# Patient Record
Sex: MALE | Race: WHITE | Employment: FULL TIME | URBAN - METROPOLITAN AREA
[De-identification: names, ages, dates, MRNs, and addresses within clinical notes are randomized per-mention and may not be internally consistent; named-entity substitution may affect disease eponyms.]

---

## 2021-04-18 ENCOUNTER — HOSPITAL ENCOUNTER (EMERGENCY)
Age: 26
Discharge: HOME | End: 2021-04-18
Attending: EMERGENCY MEDICINE
Payer: MEDICAID

## 2021-04-18 VITALS
RESPIRATION RATE: 18 BRPM | TEMPERATURE: 98.4 F | BODY MASS INDEX: 41.75 KG/M2 | WEIGHT: 315 LBS | DIASTOLIC BLOOD PRESSURE: 85 MMHG | HEIGHT: 73 IN | HEART RATE: 97 BPM | SYSTOLIC BLOOD PRESSURE: 155 MMHG | OXYGEN SATURATION: 97 %

## 2021-04-18 DIAGNOSIS — L02.91 ABSCESS: Primary | ICD-10-CM

## 2021-04-18 DIAGNOSIS — L05.01 PILONIDAL ABSCESS: ICD-10-CM

## 2021-04-18 PROCEDURE — 10080 I&D PILONIDAL CYST SIMPLE: CPT

## 2021-04-18 PROCEDURE — 10081 I&D PILONIDAL CYST COMP: CPT

## 2021-04-18 PROCEDURE — 99283 EMERGENCY DEPT VISIT LOW MDM: CPT

## 2021-04-18 RX ORDER — ACETAMINOPHEN 500 MG
1000 TABLET ORAL EVERY 6 HOURS PRN
Qty: 30 TABLET | Refills: 0 | Status: SHIPPED | OUTPATIENT
Start: 2021-04-18

## 2021-04-18 RX ORDER — SULFAMETHOXAZOLE AND TRIMETHOPRIM 800; 160 MG/1; MG/1
1 TABLET ORAL 2 TIMES DAILY
Qty: 14 TABLET | Refills: 0 | Status: SHIPPED | OUTPATIENT
Start: 2021-04-18 | End: 2021-04-25

## 2021-04-18 RX ORDER — IBUPROFEN 600 MG/1
600 TABLET ORAL EVERY 6 HOURS PRN
Qty: 30 TABLET | Refills: 0 | Status: SHIPPED | OUTPATIENT
Start: 2021-04-18

## 2021-04-18 RX ORDER — LIDOCAINE HYDROCHLORIDE 10 MG/ML
5 INJECTION, SOLUTION INFILTRATION; PERINEURAL ONCE
Status: DISCONTINUED | OUTPATIENT
Start: 2021-04-18 | End: 2021-04-18 | Stop reason: HOSPADM

## 2021-04-18 RX ORDER — CEPHALEXIN 500 MG/1
500 CAPSULE ORAL 4 TIMES DAILY
Qty: 28 CAPSULE | Refills: 0 | Status: SHIPPED | OUTPATIENT
Start: 2021-04-18 | End: 2021-04-25

## 2021-04-18 ASSESSMENT — PAIN SCALES - GENERAL: PAINLEVEL_OUTOF10: 8

## 2021-04-18 NOTE — ED NOTES
Patient has multiple abscess to coccyx measuring 3 cm width and 4 cm anterior to posterior. Patient also has 4 smaller abscess to base of neck measuring 0.5 cm diameter x4. Patient has multiple ruptured lesions to all regions of back in various stages of healing. Coccyx abscess is the only one that is draining; maldonado drainage.       Bibi Villalta RN  04/18/21 1907

## 2021-04-18 NOTE — ED PROVIDER NOTES
Mouth/Throat:      Mouth: Mucous membranes are moist.      Pharynx: Oropharynx is clear. Eyes:      General:         Right eye: No discharge. Left eye: No discharge. Conjunctiva/sclera: Conjunctivae normal.   Neck:      Musculoskeletal: Normal range of motion and neck supple. Trachea: No tracheal deviation. Cardiovascular:      Rate and Rhythm: Normal rate. Pulses: Normal pulses. Pulmonary:      Effort: Pulmonary effort is normal.   Musculoskeletal: Normal range of motion. General: No tenderness or deformity. Skin:     General: Skin is warm and dry. Capillary Refill: Capillary refill takes less than 2 seconds. Findings: No erythema or rash. Comments: 3 cm by 3 cm abscess at the top of the gluteal folds midline, already open and having some mild drainage, very fluctuant and mild tenderness, some erythema   Neurological:      General: No focal deficit present. Mental Status: He is alert and oriented to person, place, and time. Cranial Nerves: No cranial nerve deficit. Coordination: Coordination normal.   Psychiatric:         Mood and Affect: Mood normal.         Behavior: Behavior normal.         Thought Content: Thought content normal.         Judgment: Judgment normal.         MEDICAL DECISION MAKING:          PROCEDURES:    Incision/Drainage    Date/Time: 4/18/2021 4:35 PM  Performed by: Dayle Claude, MD  Authorized by: Dayle Claude, MD     Consent:     Consent obtained:  Verbal    Consent given by:  Patient  Location:     Type:  Abscess (infected pilonidal cyst)    Size:  4 by 4 cm    Location: low back. Anesthesia (see MAR for exact dosages):      Anesthesia method:  Local infiltration    Local anesthetic:  Lidocaine 1% w/o epi  Procedure type:     Complexity:  Simple  Procedure details:     Incision types:  Single straight    Incision depth:  Subcutaneous    Scalpel blade:  11    Wound management:  Probed and deloculated    Drainage: Purulent    Drainage amount:  Copious    Wound treatment:  Wound left open    Packing materials:  1/4 in iodoform gauze    Amount 1/4\" iodoform:  About 4 inches  Post-procedure details:     Patient tolerance of procedure: Tolerated well, no immediate complications      Discussed with patient anticipatory guidance, discharge instructions, follow up general surgery 48 hours  Keflex, bactrim, tylenol, motrin    DIAGNOSTIC RESULTS       EMERGENCY DEPARTMENTCOURSE:         Vitals:    Vitals:    04/18/21 1430   BP: (!) 155/85   Pulse: 97   Resp: 18   Temp: 98.4 °F (36.9 °C)   TempSrc: Oral   SpO2: 97%   Weight: (!) 360 lb (163.3 kg)   Height: 6' 1\" (1.854 m)       The patient was given the following medications while in the emergency department:  Orders Placed This Encounter   Medications    lidocaine 1 % injection 5 mL    acetaminophen (TYLENOL) 500 MG tablet     Sig: Take 2 tablets by mouth every 6 hours as needed for Pain     Dispense:  30 tablet     Refill:  0    sulfamethoxazole-trimethoprim (BACTRIM DS) 800-160 MG per tablet     Sig: Take 1 tablet by mouth 2 times daily for 7 days     Dispense:  14 tablet     Refill:  0    cephALEXin (KEFLEX) 500 MG capsule     Sig: Take 1 capsule by mouth 4 times daily for 7 days     Dispense:  28 capsule     Refill:  0    ibuprofen (IBU) 600 MG tablet     Sig: Take 1 tablet by mouth every 6 hours as needed for Pain     Dispense:  30 tablet     Refill:  0       FINAL IMPRESSION      1. Abscess    2.  Pilonidal abscess          DISPOSITION/PLAN   DISPOSITION Decision To Discharge 04/18/2021 04:31:22 PM      PATIENT REFERRED TO:  Devika Looney MD  [address] [place] [address] [id number] [phone]    Schedule an appointment as soon as possible for a visit in 2 days      DISCHARGE MEDICATIONS:  New Prescriptions    ACETAMINOPHEN (TYLENOL) 500 MG TABLET    Take 2 tablets by mouth every 6 hours as needed for Pain    CEPHALEXIN (KEFLEX) 500 MG CAPSULE    Take 1 capsule by mouth 4 times daily for 7 days    IBUPROFEN (IBU) 600 MG TABLET    Take 1 tablet by mouth every 6 hours as needed for Pain    SULFAMETHOXAZOLE-TRIMETHOPRIM (BACTRIM DS) 800-160 MG PER TABLET    Take 1 tablet by mouth 2 times daily for 7 days     Rosalina Mims MD  Attending Emergency Physician                    Rosalina Mims MD  04/18/21 5846

## 2021-04-18 NOTE — ED TRIAGE NOTES
Mode of arrival (squad #, walk in, police, etc) : walk in        Chief complaint(s): Abscess        Arrival Note (brief scenario, treatment PTA, etc). : Pt states he has had an abscess on his lower back/top of his gluteal fold. C= \"Have you ever felt that you should Cut down on your drinking? \"  No  A= \"Have people Annoyed you by criticizing your drinking? \"  No  G= \"Have you ever felt bad or Guilty about your drinking? \"  No  E= \"Have you ever had a drink as an Eye-opener first thing in the morning to steady your nerves or to help a hangover? \"  No      Deferred []      Reason for deferring: N/A    *If yes to two or more: probable alcohol abuse. *

## 2021-06-14 ENCOUNTER — OFFICE VISIT (OUTPATIENT)
Dept: FAMILY MEDICINE CLINIC | Age: 26
End: 2021-06-14
Payer: MEDICAID

## 2021-06-14 VITALS
DIASTOLIC BLOOD PRESSURE: 86 MMHG | HEIGHT: 73 IN | WEIGHT: 315 LBS | TEMPERATURE: 98.9 F | HEART RATE: 85 BPM | OXYGEN SATURATION: 99 % | SYSTOLIC BLOOD PRESSURE: 131 MMHG | BODY MASS INDEX: 41.75 KG/M2

## 2021-06-14 DIAGNOSIS — L05.01 PILONIDAL CYST WITH ABSCESS: Primary | ICD-10-CM

## 2021-06-14 PROCEDURE — 99213 OFFICE O/P EST LOW 20 MIN: CPT | Performed by: NURSE PRACTITIONER

## 2021-06-14 RX ORDER — TRAMADOL HYDROCHLORIDE 50 MG/1
50 TABLET ORAL EVERY 8 HOURS PRN
Qty: 9 TABLET | Refills: 0 | Status: SHIPPED | OUTPATIENT
Start: 2021-06-14 | End: 2021-06-17

## 2021-06-14 RX ORDER — CEPHALEXIN 500 MG/1
500 CAPSULE ORAL 4 TIMES DAILY
Qty: 40 CAPSULE | Refills: 0 | Status: SHIPPED | OUTPATIENT
Start: 2021-06-14 | End: 2021-06-24

## 2021-06-14 ASSESSMENT — ENCOUNTER SYMPTOMS
COUGH: 0
CHANGE IN BOWEL HABIT: 0
SORE THROAT: 0
VISUAL CHANGE: 0
ROS SKIN COMMENTS: PILONIDAL CYST
ABDOMINAL PAIN: 0
VOMITING: 0
NAUSEA: 0
SWOLLEN GLANDS: 0

## 2021-06-14 ASSESSMENT — PATIENT HEALTH QUESTIONNAIRE - PHQ9
SUM OF ALL RESPONSES TO PHQ9 QUESTIONS 1 & 2: 0
1. LITTLE INTEREST OR PLEASURE IN DOING THINGS: 0
SUM OF ALL RESPONSES TO PHQ QUESTIONS 1-9: 0
SUM OF ALL RESPONSES TO PHQ QUESTIONS 1-9: 0
2. FEELING DOWN, DEPRESSED OR HOPELESS: 0
SUM OF ALL RESPONSES TO PHQ QUESTIONS 1-9: 0

## 2021-06-14 NOTE — PROGRESS NOTES
[facility]   [address]  Dept: [phone]  Dept Fax: [phone]    Bari Barrett is a 23 y.o. male who presents today forhis medical conditions/complaints as noted below. Bari Rape is c/o of   Chief Complaint   Patient presents with    Cyst     on lower back,painful, warm,itchy,started hurting again 2 days ago     HPI:     Other  This is a new problem. The current episode started yesterday (x's 2 days ). The problem occurs constantly. The problem has been gradually worsening. Pertinent negatives include no abdominal pain, anorexia, arthralgias, change in bowel habit, chest pain, chills, congestion, coughing, diaphoresis, fatigue, fever, headaches, joint swelling, myalgias, nausea, neck pain, numbness, rash, sore throat, swollen glands, urinary symptoms, vertigo, visual change, vomiting or weakness. The symptoms are aggravated by bending, twisting and walking. He has tried nothing for the symptoms. The treatment provided no relief. Cyst lower back. Has HX pilonidal Cyst.  Was last I & D in April in ED. Has surgery referral and appointment set-up for July for removal.   Began flaring up x's 2 days ago. Describes pain a level 9/10. Denies any fever or chills. Past Medical History:   Diagnosis Date    ADHD       History reviewed. No pertinent surgical history. History reviewed. No pertinent family history.     Social History     Tobacco Use    Smoking status: Current Every Day Smoker     Packs/day: 1.00     Types: Cigarettes    Smokeless tobacco: Never Used   Substance Use Topics    Alcohol use: Yes     Comment: socially      Current Outpatient Medications   Medication Sig Dispense Refill    cephALEXin (KEFLEX) 500 MG capsule Take 1 capsule by mouth 4 times daily for 10 days 40 capsule 0    sulfamethoxazole-trimethoprim (BACTRIM DS) 800-160 MG per tablet Take 1 tablet by mouth 2 times daily for 10 days 20 tablet 0    ibuprofen (ADVIL;MOTRIN) 800 MG tablet Take 1 tablet by mouth every 8 hours as needed for Pain 20 tablet 0    acetaminophen (TYLENOL) 500 MG tablet Take 2 tablets by mouth every 6 hours as needed for Pain (Patient not taking: Reported on 6/14/2021) 30 tablet 0    ibuprofen (IBU) 600 MG tablet Take 1 tablet by mouth every 6 hours as needed for Pain (Patient not taking: Reported on 6/14/2021) 30 tablet 0     No current facility-administered medications for this visit. No Known Allergies    Subjective:      Review of Systems   Constitutional: Negative for chills, diaphoresis, fatigue and fever. HENT: Negative for congestion and sore throat. Respiratory: Negative for cough. Cardiovascular: Negative for chest pain. Gastrointestinal: Negative for abdominal pain, anorexia, change in bowel habit, nausea and vomiting. Musculoskeletal: Negative for arthralgias, joint swelling, myalgias and neck pain. Skin: Negative for rash. Pilonidal cyst    Neurological: Negative for vertigo, weakness, numbness and headaches. Objective:      Physical Exam  Vitals reviewed. Constitutional:       Appearance: He is well-developed. HENT:      Head: Normocephalic. Right Ear: External ear normal.      Left Ear: External ear normal.      Nose: Nose normal.   Eyes:      Conjunctiva/sclera: Conjunctivae normal.      Pupils: Pupils are equal, round, and reactive to light. Cardiovascular:      Rate and Rhythm: Normal rate and regular rhythm. Heart sounds: Normal heart sounds. No murmur heard. No friction rub. No gallop. Pulmonary:      Effort: Pulmonary effort is normal. No respiratory distress. Breath sounds: Normal breath sounds. Abdominal:      General: Bowel sounds are normal.      Palpations: Abdomen is soft. Musculoskeletal:         General: Normal range of motion. Cervical back: Normal range of motion and neck supple.    Lymphadenopathy:      Cervical: No cervical adenopathy. Skin:     General: Skin is warm and dry. Findings: Abscess and erythema present. No rash. Comments: Pilonidal cyst flare-up not to coccyx. Moderate induration, no fluctuance at this time- I & D not indicated. Very painful with slight palpation. Neurological:      Mental Status: He is alert and oriented to person, place, and time. Cranial Nerves: No cranial nerve deficit. Coordination: Coordination normal.      Deep Tendon Reflexes: Reflexes are normal and symmetric. Psychiatric:         Thought Content: Thought content normal.       /86   Pulse 85   Temp 98.9 °F (37.2 °C) (Infrared)   Ht 6' 1\" (1.854 m)   Wt (!) 350 lb (158.8 kg)   SpO2 99%   BMI 46.18 kg/m²     Assessment:       Diagnosis Orders   1. Pilonidal cyst with abscess  traMADol (ULTRAM) 50 MG tablet    cephALEXin (KEFLEX) 500 MG capsule           Plan:     1.) Unable to I & D at this time, due to no fluctuance upon exam of cyst   2.) Will start Keflex at this time   3.) Warm compresses PRN   4.) Will RX short course pain meds- OARS report viewed- no concern for abuse   5.) Start Ultram PRN pain control   6.) May RTO if ABX are ineffective and area of fluctuance develops. 7.) Depending on severity of pain- may benefit from I & D in ED setting for better pain control during procedure   8.) Keep follow-up with surgeon for removal of pilonidal cyst   9.) RTO PRN   10.) Follow-up with PCP     Problem List     None         Patient given educationalmaterials - see patient instructions. Discussed use, benefit, and side effectsof prescribed medications. All patient questions answered. Pt verbalized understanding. Instructed to continue current medications, diet and exercise. Patient agreedwith treatment plan. Follow up as directed.      Electronically signed by ALPHONSE Castellanos CNP on 6/22/2021 at 10:49 AM  ]

## 2021-06-14 NOTE — PATIENT INSTRUCTIONS
Patient Education        Learning About Pilonidal Disease  What is pilonidal disease? Pilonidal (say \"cq-osy-AQ-dul\") disease is a long-term skin infection. The infection develops in a cyst at the top of or next to the crease between the buttocks. The cyst may look like a small dimple, which is called a \"pit\" or \"sinus. \" Hair may grow from the pit, and you may have several pits. What are the symptoms? · You may have no symptoms. · If the cyst is infected, you may:  ? Have redness or swelling in the area. ? Have cloudy fluid or blood draining from the cyst.  ? Find it hard to walk or sit because of pain. ? Have a fever. This is not common. How can you prevent infection? You may be able to prevent infection and symptoms. · Keep the area clean and dry. · Avoid sitting on hard surfaces for long periods of time. How is pilonidal disease treated? If you have a pilonidal cyst that is not causing symptoms, you don't need medical treatment. If a pilonidal cyst is infected:  · You will probably get antibiotics. If your doctor prescribes antibiotics, take them as directed. Do not stop taking them just because you feel better. You need to take the full course of antibiotics. · Soak in a warm tub several times a day. · Ask your doctor if you can take an over-the-counter pain medicine, such as acetaminophen (Tylenol), ibuprofen (Advil, Motrin), or naproxen (Aleve). Read and follow all instructions on the label. · You may need to have the cyst cut open and drained or removed. Follow-up care is a key part of your treatment and safety. Be sure to make and go to all appointments, and call your doctor if you are having problems. It's also a good idea to know your test results and keep a list of the medicines you take. Where can you learn more? Go to [email or web address]://[email or web address].[employer]. org and sign in to your [employer] account.  Enter C612 in the CYPHER box to learn more about \"Learning About

## 2021-06-14 NOTE — LETTER
[facility]   [facility] [address] [address]  Phone: [phone]  Fax: [phone]    ALPHONSE Tellez CNP        June 14, 2021     Patient: Cecilia Quiñones   YOB: 1995   Date of Visit: 6/14/2021       To Whom It May Concern: Cecilia Quiñones was evaluated and treated today. It is my medical opinion that Cecilia Quiñones may return to work on 06/20/21. If you have any questions or concerns, please don't hesitate to call.     Sincerely,        ALPHONSE Tellez CNP

## 2021-06-17 ENCOUNTER — HOSPITAL ENCOUNTER (EMERGENCY)
Age: 26
Discharge: HOME | End: 2021-06-17
Attending: EMERGENCY MEDICINE
Payer: MEDICAID

## 2021-06-17 VITALS
OXYGEN SATURATION: 96 % | RESPIRATION RATE: 16 BRPM | HEART RATE: 74 BPM | DIASTOLIC BLOOD PRESSURE: 93 MMHG | HEIGHT: 73 IN | WEIGHT: 315 LBS | SYSTOLIC BLOOD PRESSURE: 140 MMHG | TEMPERATURE: 96.8 F | BODY MASS INDEX: 41.75 KG/M2

## 2021-06-17 DIAGNOSIS — L05.01 PILONIDAL ABSCESS: Primary | ICD-10-CM

## 2021-06-17 PROCEDURE — 6370000000 HC RX 637 (ALT 250 FOR IP): Performed by: PHYSICIAN ASSISTANT

## 2021-06-17 PROCEDURE — 99284 EMERGENCY DEPT VISIT MOD MDM: CPT

## 2021-06-17 RX ORDER — IBUPROFEN 800 MG/1
800 TABLET ORAL EVERY 8 HOURS PRN
Qty: 20 TABLET | Refills: 0 | Status: SHIPPED | OUTPATIENT
Start: 2021-06-17

## 2021-06-17 RX ORDER — IBUPROFEN 800 MG/1
800 TABLET ORAL ONCE
Status: COMPLETED | OUTPATIENT
Start: 2021-06-17 | End: 2021-06-17

## 2021-06-17 RX ORDER — SULFAMETHOXAZOLE AND TRIMETHOPRIM 800; 160 MG/1; MG/1
1 TABLET ORAL ONCE
Status: COMPLETED | OUTPATIENT
Start: 2021-06-17 | End: 2021-06-17

## 2021-06-17 RX ORDER — TRAMADOL HYDROCHLORIDE 50 MG/1
50 TABLET ORAL EVERY 4 HOURS PRN
Qty: 10 TABLET | Refills: 0 | Status: SHIPPED | OUTPATIENT
Start: 2021-06-17 | End: 2021-06-20

## 2021-06-17 RX ORDER — SULFAMETHOXAZOLE AND TRIMETHOPRIM 800; 160 MG/1; MG/1
1 TABLET ORAL 2 TIMES DAILY
Qty: 20 TABLET | Refills: 0 | Status: SHIPPED | OUTPATIENT
Start: 2021-06-17 | End: 2021-06-27

## 2021-06-17 RX ADMIN — SULFAMETHOXAZOLE AND TRIMETHOPRIM 1 TABLET: 800; 160 TABLET ORAL at 12:51

## 2021-06-17 RX ADMIN — IBUPROFEN 800 MG: 800 TABLET, FILM COATED ORAL at 12:51

## 2021-06-17 ASSESSMENT — PAIN DESCRIPTION - LOCATION: LOCATION: BACK

## 2021-06-17 ASSESSMENT — PAIN SCALES - GENERAL: PAINLEVEL_OUTOF10: 4

## 2021-06-17 ASSESSMENT — PAIN DESCRIPTION - ORIENTATION: ORIENTATION: LOWER

## 2021-06-17 NOTE — ED NOTES
Mode of arrival (squad #, walk in, police, etc) : walk in       Chief complaint(s): abscess for years        Arrival Note (brief scenario, treatment PTA, etc). : pt on abx and tramadol reporting to ed for chronic abscess to lower back drainage. Patient ambulatory in 4/10 pain with PO medication. A&Ox4, NAD, respirations even and unlabored with no increased WOB or SOB. C= \"Have you ever felt that you should Cut down on your drinking? \"  No  A= \"Have people Annoyed you by criticizing your drinking? \"  No  G= \"Have you ever felt bad or Guilty about your drinking? \"  No  E= \"Have you ever had a drink as an Eye-opener first thing in the morning to steady your nerves or to help a hangover? \"  No      Deferred []      Reason for deferring: N/A    *If yes to two or more: probable alcohol abuse. Geovany Blair RN  06/17/21 4207

## 2021-06-17 NOTE — ED PROVIDER NOTES
[address] [facility]  Emergency Department  Independent Attestation     Pt Name: Kiana Mahoney  MRN: [id number]  [place] 1995  Date of evaluation: 6/17/21       Kiana Mahoney is a 23 y.o. male who presents with Abscess      I was personally available for consultation in the Emergency Department.     Laura Schulte DO  Attending Emergency Physician  [address] [facility]      (Please note that portions of this note were completed with a voice recognition program.  Efforts were made to edit the dictations but occasionally words are mis-transcribed.)        Laura Schulte DO  06/17/21 1234

## 2023-07-24 NOTE — ED PROVIDER NOTES
[address] [facility]  eMERGENCY dEPARTMENT eNCOUnter      Pt Name: Virgie Mabry  MRN: [id number]  [place] 1995  Date of evaluation: 6/17/21      CHIEF COMPLAINT:  Chief Complaint   Patient presents with    Abscess       HISTORY OF PRESENT ILLNESS    Virgie Mabry is a 23 y.o. male who presents to the emergency room complaining of abscess in buttock. Pt states it has been present for 2 years. States for the past 4-5 days it has become tender, red, swollen. States it is drainage purulent/ bloody discharge. Pt did go to urgent care and was given keflex on Monday. He has been trying warm compresses. Denies fever, chills, nausea, emesis, pain with BM. No other complaints. Pt has had these drained in the past.     Nursing Notes were reviewed. REVIEW OF SYSTEMS       Constitutional:  Per HPI  Eyes: No visual changes. Neck: No neck pain. Respiratory: Denies recent shortness of breath. Cardiac:  Denies recent chest pain. GI:  Per HPI  Musculoskeletal: Denies focal weakness. Neurologic: Denies headache or focal weakness. Skin:  rash    Negative in 10 essential Systems except as mentioned above and in the HPI. PAST MEDICAL HISTORY   PMH:  has a past medical history of ADHD. Surgical History:  has no past surgical history on file. Social History:  reports that he has been smoking cigarettes. He has been smoking about 1.00 pack per day. He has never used smokeless tobacco. He reports current alcohol use. He reports current drug use. Frequency: 4.00 times per week. Drug: Marijuana. Family History: None  Psychiatric History: None    Allergies:has No Known Allergies.       PHYSICAL EXAM     INITIAL VITALS: BP (!) 140/93   Pulse 74   Temp 96.8 °F (36 °C) (Temporal)   Resp 16   Ht 6' 1\" (1.854 m)   Wt (!) 350 lb (158.8 kg)   SpO2 96%   BMI 46.18 kg/m²   Constitutional:  Well developed   Eyes:  Pupils equal and readily reactive to light  HENT:  Atraumatic, external ears normal, nose normal, oropharynx moist. Neck- supple   Respiratory:  Clear to auscultation bilaterally with good air exchange, no W/R/R  Cardiovascular:  RRR with normal S1 and S2  Gastrointestinal/Abdomen:  Soft, NT.  BS present. Musculoskeletal:  No edema, no tenderness, no deformities. Back:  No CVA tenderness. Normal to inspection. Integument:  Mila Bosch RN, present. 2cm x 2cm area of erythema, tenderness, fluctuance gluteal cleft. Consistent with pilonidal cyst.  Drainage noted. No rectal involvement. No streaking. Neurologic:  Alert & oriented x 3, no focal deficits noted       DIAGNOSTIC RESULTS     EKG: All EKG's are interpreted by the Emergency Department Physician who either signs or Co-signs this chart in the absence of a cardiologist.  Not indicated    RADIOLOGY:   Reviewed the radiologist:  No orders to display     Not indicated      LABS:  Labs Reviewed - No data to display      EMERGENCY DEPARTMENT COURSE:   -------------------------  Pilonidal cyst.   Pt on keflex. Did recommend I&D. Pt refused. Will add bactrim, motrin, tramadol. Continue warm compresses. Will refer to surgery. Recommend return in 48 hours for recheck. Discussed results and plan with the pt. They expressed appropriate understanding. Pt given close follow up, supportive care instructions and strict return instructions at the bedside. Patient was given oral antibiotics for bacterial coverage and both verbal and written instructions to follow up to ED or Family Doctor in two days for recheck and/or packing change. Was instructed to return for any worsening of the abscess or surrounding cellulitis.     Orders Placed This Encounter   Medications    sulfamethoxazole-trimethoprim (BACTRIM DS;SEPTRA DS) 800-160 MG per tablet 1 tablet     Order Specific Question:   Antimicrobial Indications     Answer:   Skin and Soft Tissue Infection    ibuprofen (ADVIL;MOTRIN) tablet 800 mg    sulfamethoxazole-trimethoprim (BACTRIM DS) 800-160 MG per tablet     Sig: Take 1 tablet by mouth 2 times daily for 10 days     Dispense:  20 tablet     Refill:  0    ibuprofen (ADVIL;MOTRIN) 800 MG tablet     Sig: Take 1 tablet by mouth every 8 hours as needed for Pain     Dispense:  20 tablet     Refill:  0    traMADol (ULTRAM) 50 MG tablet     Sig: Take 1 tablet by mouth every 4 hours as needed for Pain for up to 3 days. Intended supply: 5 days. Take lowest dose possible to manage pain     Dispense:  10 tablet     Refill:  0       CONSULTS:  None      FINAL IMPRESSION      1. Pilonidal abscess          DISPOSITION/PLAN:  DISPOSITION Decision To Discharge 06/17/2021 12:42:45 PM        PATIENT REFERRED TO:  [facility]  [address]  [address] [address]  [phone]  Call       Azra Doll [place] [id number] [phone]            DISCHARGE MEDICATIONS:  New Prescriptions    IBUPROFEN (ADVIL;MOTRIN) 800 MG TABLET    Take 1 tablet by mouth every 8 hours as needed for Pain    SULFAMETHOXAZOLE-TRIMETHOPRIM (BACTRIM DS) 800-160 MG PER TABLET    Take 1 tablet by mouth 2 times daily for 10 days    TRAMADOL (ULTRAM) 50 MG TABLET    Take 1 tablet by mouth every 4 hours as needed for Pain for up to 3 days. Intended supply: 5 days.  Take lowest dose possible to manage pain       (Please note that portions of this note were completed with a voice recognition program.  Efforts were made to edit the dictations but occasionally words are mis-transcribed.)    Misha Frausto. [address], PA-C  06/17/21 0883 Yes

## 2024-08-12 ENCOUNTER — HOSPITAL ENCOUNTER (OUTPATIENT)
Dept: PREADMISSION TESTING | Age: 29
Discharge: HOME | End: 2024-08-16
Payer: MEDICAID

## 2024-08-12 VITALS
HEART RATE: 80 BPM | BODY MASS INDEX: 41.75 KG/M2 | HEIGHT: 73 IN | OXYGEN SATURATION: 99 % | SYSTOLIC BLOOD PRESSURE: 146 MMHG | WEIGHT: 315 LBS | RESPIRATION RATE: 14 BRPM | DIASTOLIC BLOOD PRESSURE: 78 MMHG | TEMPERATURE: 98.2 F

## 2024-08-12 LAB
ANION GAP SERPL CALCULATED.3IONS-SCNC: 10 MMOL/L (ref 9–17)
BUN SERPL-MCNC: 9 MG/DL (ref 6–20)
BUN/CREAT SERPL: 11 (ref 9–20)
CALCIUM SERPL-MCNC: 9.7 MG/DL (ref 8.6–10.4)
CHLORIDE SERPL-SCNC: 104 MMOL/L (ref 98–107)
CO2 SERPL-SCNC: 26 MMOL/L (ref 20–31)
CREAT SERPL-MCNC: 0.8 MG/DL (ref 0.7–1.2)
ERYTHROCYTE [DISTWIDTH] IN BLOOD BY AUTOMATED COUNT: 12.5 % (ref 11.8–14.4)
GFR, ESTIMATED: >90 ML/MIN/1.73M2
GLUCOSE SERPL-MCNC: 95 MG/DL (ref 70–99)
HCT VFR BLD AUTO: 43.6 % (ref 40.7–50.3)
HGB BLD-MCNC: 14.3 G/DL (ref 13–17)
MCH RBC QN AUTO: 31.8 PG (ref 25.2–33.5)
MCHC RBC AUTO-ENTMCNC: 32.8 G/DL (ref 28.4–34.8)
MCV RBC AUTO: 96.9 FL (ref 82.6–102.9)
NRBC BLD-RTO: 0 PER 100 WBC
PLATELET # BLD AUTO: 253 K/UL (ref 138–453)
PMV BLD AUTO: 10.6 FL (ref 8.1–13.5)
POTASSIUM SERPL-SCNC: 4 MMOL/L (ref 3.7–5.3)
RBC # BLD AUTO: 4.5 M/UL (ref 4.21–5.77)
SODIUM SERPL-SCNC: 140 MMOL/L (ref 135–144)
WBC OTHER # BLD: 8.4 K/UL (ref 3.5–11.3)

## 2024-08-12 PROCEDURE — 85027 COMPLETE CBC AUTOMATED: CPT

## 2024-08-12 PROCEDURE — 80048 BASIC METABOLIC PNL TOTAL CA: CPT

## 2024-08-12 PROCEDURE — 93005 ELECTROCARDIOGRAM TRACING: CPT | Performed by: ANESTHESIOLOGY

## 2024-08-12 PROCEDURE — 36415 COLL VENOUS BLD VENIPUNCTURE: CPT

## 2024-08-12 NOTE — PRE-PROCEDURE INSTRUCTIONS
ARRIVE AT THE HOSPITAL ON Monday, August 26,2024 at 12:15pm     Once you enter the hospital lobby, take the elevators to the second floor.  Check-In is at the surgery registration desk.      Continue to take your home medications as you normally do up to and including the night before surgery with the exception of any blood thinning medications.    Please stop any blood thinning medications as directed by your surgeon or prescribing physician. Failure to stop certain medications may interfere with your scheduled surgery.    These may include:  Aspirin, Warfarin (Coumadin), Clopidogrel (Plavix), Ibuprofen (Motrin, Advil), Naproxen (Aleve), Meloxicam (Mobic), Celecoxib (Celebrex), Eliquis, Pradaxa, Xarelto, Effient, Fish Oil, Herbal supplements.     No aspirin,aleve or ibuprofen 7 days before surgery  Tylenol is okay to take up until day of surgery if needed    Please take the following medication(s) the day of surgery with a small sip of water:  none        PREPARING FOR YOUR SURGERY:     Before surgery, you can play an important role in your own health. Because skin is not sterile, we need to be sure that your skin is as free of germs as possible before surgery by carefully washing before surgery.  Preparing or “prepping” skin before surgery can reduce the risk of a “surgical site infection.”  Do not shave the area of your body where your surgery will be performed unless you received specific permission from your physician.    You will need to shower at home the night before surgery and the morning of surgery with a special soap called chlorhexidine gluconate (CHG*).     *Not to be used by people allergic to Chlorhexidine Gluconate (CHG).    Following these instructions will help you be sure that your skin is clean before surgery.    Instructions on cleaning your skin before surgery:     The night before your surgery:     You will need to shower with warm water (not hot) and the CHG soap.      Use a clean wash

## 2024-08-14 LAB
EKG ATRIAL RATE: 64 BPM
EKG P AXIS: 59 DEGREES
EKG P-R INTERVAL: 148 MS
EKG Q-T INTERVAL: 408 MS
EKG QRS DURATION: 90 MS
EKG QTC CALCULATION (BAZETT): 420 MS
EKG R AXIS: 40 DEGREES
EKG T AXIS: 38 DEGREES
EKG VENTRICULAR RATE: 64 BPM

## 2024-08-25 ENCOUNTER — ANESTHESIA EVENT (OUTPATIENT)
Dept: OPERATING ROOM | Age: 29
End: 2024-08-25
Payer: COMMERCIAL

## 2024-08-26 ENCOUNTER — ANESTHESIA (OUTPATIENT)
Dept: OPERATING ROOM | Age: 29
End: 2024-08-26
Payer: COMMERCIAL

## 2024-08-26 ENCOUNTER — HOSPITAL ENCOUNTER (OUTPATIENT)
Age: 29
Setting detail: OUTPATIENT SURGERY
Discharge: HOME | End: 2024-08-26
Attending: SURGERY | Admitting: SURGERY
Payer: COMMERCIAL

## 2024-08-26 VITALS
OXYGEN SATURATION: 98 % | HEART RATE: 59 BPM | TEMPERATURE: 97.9 F | RESPIRATION RATE: 15 BRPM | HEIGHT: 73 IN | DIASTOLIC BLOOD PRESSURE: 80 MMHG | BODY MASS INDEX: 41.75 KG/M2 | WEIGHT: 315 LBS | SYSTOLIC BLOOD PRESSURE: 113 MMHG

## 2024-08-26 DIAGNOSIS — L98.8 PILONIDAL DISEASE: ICD-10-CM

## 2024-08-26 DIAGNOSIS — Z98.890 HISTORY OF SURGICAL REMOVAL OF PILONIDAL CYST: Primary | ICD-10-CM

## 2024-08-26 PROCEDURE — 6370000000 HC RX 637 (ALT 250 FOR IP): Performed by: SURGERY

## 2024-08-26 PROCEDURE — 2580000003 HC RX 258: Performed by: ANESTHESIOLOGY

## 2024-08-26 PROCEDURE — 7100000000 HC PACU RECOVERY - FIRST 15 MIN: Performed by: SURGERY

## 2024-08-26 PROCEDURE — 7100000001 HC PACU RECOVERY - ADDTL 15 MIN: Performed by: SURGERY

## 2024-08-26 PROCEDURE — 2580000003 HC RX 258: Performed by: SURGERY

## 2024-08-26 PROCEDURE — 88304 TISSUE EXAM BY PATHOLOGIST: CPT

## 2024-08-26 PROCEDURE — 7100000011 HC PHASE II RECOVERY - ADDTL 15 MIN: Performed by: SURGERY

## 2024-08-26 PROCEDURE — 6360000002 HC RX W HCPCS: Performed by: NURSE ANESTHETIST, CERTIFIED REGISTERED

## 2024-08-26 PROCEDURE — 3700000000 HC ANESTHESIA ATTENDED CARE: Performed by: SURGERY

## 2024-08-26 PROCEDURE — 6360000002 HC RX W HCPCS: Performed by: SURGERY

## 2024-08-26 PROCEDURE — 2500000003 HC RX 250 WO HCPCS: Performed by: NURSE ANESTHETIST, CERTIFIED REGISTERED

## 2024-08-26 PROCEDURE — 3700000001 HC ADD 15 MINUTES (ANESTHESIA): Performed by: SURGERY

## 2024-08-26 PROCEDURE — 2500000003 HC RX 250 WO HCPCS: Performed by: SURGERY

## 2024-08-26 PROCEDURE — 3600000002 HC SURGERY LEVEL 2 BASE: Performed by: SURGERY

## 2024-08-26 PROCEDURE — 7100000010 HC PHASE II RECOVERY - FIRST 15 MIN: Performed by: SURGERY

## 2024-08-26 PROCEDURE — 2709999900 HC NON-CHARGEABLE SUPPLY: Performed by: SURGERY

## 2024-08-26 PROCEDURE — 3600000012 HC SURGERY LEVEL 2 ADDTL 15MIN: Performed by: SURGERY

## 2024-08-26 PROCEDURE — 6360000002 HC RX W HCPCS: Performed by: ANESTHESIOLOGY

## 2024-08-26 RX ORDER — SODIUM CHLORIDE, SODIUM LACTATE, POTASSIUM CHLORIDE, CALCIUM CHLORIDE 600; 310; 30; 20 MG/100ML; MG/100ML; MG/100ML; MG/100ML
INJECTION, SOLUTION INTRAVENOUS CONTINUOUS
Status: DISCONTINUED | OUTPATIENT
Start: 2024-08-26 | End: 2024-08-26 | Stop reason: HOSPADM

## 2024-08-26 RX ORDER — SULFAMETHOXAZOLE AND TRIMETHOPRIM 400; 80 MG/1; MG/1
1 TABLET ORAL 2 TIMES DAILY
Qty: 14 TABLET | Refills: 0 | Status: SHIPPED | OUTPATIENT
Start: 2024-08-26 | End: 2024-09-02

## 2024-08-26 RX ORDER — CLINDAMYCIN PHOSPHATE 10 UG/ML
LOTION TOPICAL
COMMUNITY
Start: 2024-05-31

## 2024-08-26 RX ORDER — MEPERIDINE HYDROCHLORIDE 50 MG/ML
12.5 INJECTION INTRAMUSCULAR; INTRAVENOUS; SUBCUTANEOUS EVERY 5 MIN PRN
Status: DISCONTINUED | OUTPATIENT
Start: 2024-08-26 | End: 2024-08-26 | Stop reason: HOSPADM

## 2024-08-26 RX ORDER — HYDROMORPHONE HYDROCHLORIDE 1 MG/ML
0.5 INJECTION, SOLUTION INTRAMUSCULAR; INTRAVENOUS; SUBCUTANEOUS EVERY 5 MIN PRN
Status: DISCONTINUED | OUTPATIENT
Start: 2024-08-26 | End: 2024-08-26 | Stop reason: HOSPADM

## 2024-08-26 RX ORDER — DOCUSATE SODIUM 100 MG/1
100 CAPSULE, LIQUID FILLED ORAL 2 TIMES DAILY
Qty: 60 CAPSULE | Refills: 0 | Status: SHIPPED | OUTPATIENT
Start: 2024-08-26 | End: 2024-09-25

## 2024-08-26 RX ORDER — FENTANYL CITRATE 50 UG/ML
25 INJECTION, SOLUTION INTRAMUSCULAR; INTRAVENOUS EVERY 5 MIN PRN
Status: DISCONTINUED | OUTPATIENT
Start: 2024-08-26 | End: 2024-08-26 | Stop reason: HOSPADM

## 2024-08-26 RX ORDER — DIPHENHYDRAMINE HYDROCHLORIDE 50 MG/ML
12.5 INJECTION INTRAMUSCULAR; INTRAVENOUS
Status: DISCONTINUED | OUTPATIENT
Start: 2024-08-26 | End: 2024-08-26 | Stop reason: HOSPADM

## 2024-08-26 RX ORDER — FENTANYL CITRATE 50 UG/ML
INJECTION, SOLUTION INTRAMUSCULAR; INTRAVENOUS PRN
Status: DISCONTINUED | OUTPATIENT
Start: 2024-08-26 | End: 2024-08-26 | Stop reason: SDUPTHER

## 2024-08-26 RX ORDER — ROCURONIUM BROMIDE 10 MG/ML
INJECTION, SOLUTION INTRAVENOUS PRN
Status: DISCONTINUED | OUTPATIENT
Start: 2024-08-26 | End: 2024-08-26 | Stop reason: SDUPTHER

## 2024-08-26 RX ORDER — SODIUM CHLORIDE 0.9 % (FLUSH) 0.9 %
5-40 SYRINGE (ML) INJECTION EVERY 12 HOURS SCHEDULED
Status: DISCONTINUED | OUTPATIENT
Start: 2024-08-26 | End: 2024-08-26 | Stop reason: HOSPADM

## 2024-08-26 RX ORDER — PROCHLORPERAZINE EDISYLATE 5 MG/ML
10 INJECTION INTRAMUSCULAR; INTRAVENOUS
Status: DISCONTINUED | OUTPATIENT
Start: 2024-08-26 | End: 2024-08-26 | Stop reason: HOSPADM

## 2024-08-26 RX ORDER — OXYCODONE HYDROCHLORIDE 5 MG/1
5 TABLET ORAL
Status: DISCONTINUED | OUTPATIENT
Start: 2024-08-26 | End: 2024-08-26 | Stop reason: HOSPADM

## 2024-08-26 RX ORDER — MIDAZOLAM HYDROCHLORIDE 1 MG/ML
INJECTION INTRAMUSCULAR; INTRAVENOUS PRN
Status: DISCONTINUED | OUTPATIENT
Start: 2024-08-26 | End: 2024-08-26 | Stop reason: SDUPTHER

## 2024-08-26 RX ORDER — SODIUM CHLORIDE 0.9 % (FLUSH) 0.9 %
5-40 SYRINGE (ML) INJECTION PRN
Status: DISCONTINUED | OUTPATIENT
Start: 2024-08-26 | End: 2024-08-26 | Stop reason: HOSPADM

## 2024-08-26 RX ORDER — NALOXONE HYDROCHLORIDE 0.4 MG/ML
INJECTION, SOLUTION INTRAMUSCULAR; INTRAVENOUS; SUBCUTANEOUS PRN
Status: DISCONTINUED | OUTPATIENT
Start: 2024-08-26 | End: 2024-08-26 | Stop reason: HOSPADM

## 2024-08-26 RX ORDER — SODIUM CHLORIDE 9 MG/ML
INJECTION, SOLUTION INTRAVENOUS CONTINUOUS
Status: DISCONTINUED | OUTPATIENT
Start: 2024-08-26 | End: 2024-08-26 | Stop reason: HOSPADM

## 2024-08-26 RX ORDER — LIDOCAINE HYDROCHLORIDE 20 MG/ML
INJECTION, SOLUTION EPIDURAL; INFILTRATION; INTRACAUDAL; PERINEURAL PRN
Status: DISCONTINUED | OUTPATIENT
Start: 2024-08-26 | End: 2024-08-26 | Stop reason: SDUPTHER

## 2024-08-26 RX ORDER — ONDANSETRON 4 MG/1
4 TABLET, ORALLY DISINTEGRATING ORAL 3 TIMES DAILY PRN
Qty: 21 TABLET | Refills: 0 | Status: SHIPPED | OUTPATIENT
Start: 2024-08-26

## 2024-08-26 RX ORDER — ONDANSETRON 2 MG/ML
INJECTION INTRAMUSCULAR; INTRAVENOUS PRN
Status: DISCONTINUED | OUTPATIENT
Start: 2024-08-26 | End: 2024-08-26 | Stop reason: SDUPTHER

## 2024-08-26 RX ORDER — LIDOCAINE HYDROCHLORIDE 10 MG/ML
1 INJECTION, SOLUTION EPIDURAL; INFILTRATION; INTRACAUDAL; PERINEURAL
Status: DISCONTINUED | OUTPATIENT
Start: 2024-08-27 | End: 2024-08-26 | Stop reason: HOSPADM

## 2024-08-26 RX ORDER — SUCCINYLCHOLINE/SOD CL,ISO/PF 100 MG/5ML
SYRINGE (ML) INTRAVENOUS PRN
Status: DISCONTINUED | OUTPATIENT
Start: 2024-08-26 | End: 2024-08-26 | Stop reason: SDUPTHER

## 2024-08-26 RX ORDER — METOCLOPRAMIDE HYDROCHLORIDE 5 MG/ML
10 INJECTION INTRAMUSCULAR; INTRAVENOUS
Status: DISCONTINUED | OUTPATIENT
Start: 2024-08-26 | End: 2024-08-26 | Stop reason: HOSPADM

## 2024-08-26 RX ORDER — GINSENG 100 MG
CAPSULE ORAL PRN
Status: DISCONTINUED | OUTPATIENT
Start: 2024-08-26 | End: 2024-08-26 | Stop reason: ALTCHOICE

## 2024-08-26 RX ORDER — PROPOFOL 10 MG/ML
INJECTION, EMULSION INTRAVENOUS PRN
Status: DISCONTINUED | OUTPATIENT
Start: 2024-08-26 | End: 2024-08-26 | Stop reason: SDUPTHER

## 2024-08-26 RX ORDER — SODIUM CHLORIDE 9 MG/ML
INJECTION, SOLUTION INTRAVENOUS PRN
Status: DISCONTINUED | OUTPATIENT
Start: 2024-08-26 | End: 2024-08-26 | Stop reason: HOSPADM

## 2024-08-26 RX ORDER — HYDROCODONE BITARTRATE AND ACETAMINOPHEN 5; 325 MG/1; MG/1
1 TABLET ORAL EVERY 6 HOURS PRN
Qty: 28 TABLET | Refills: 0 | Status: SHIPPED | OUTPATIENT
Start: 2024-08-26 | End: 2024-09-02

## 2024-08-26 RX ADMIN — SUGAMMADEX 200 MG: 100 INJECTION, SOLUTION INTRAVENOUS at 14:49

## 2024-08-26 RX ADMIN — LIDOCAINE HYDROCHLORIDE 80 MG: 20 INJECTION, SOLUTION EPIDURAL; INFILTRATION; INTRACAUDAL; PERINEURAL at 13:28

## 2024-08-26 RX ADMIN — FENTANYL CITRATE 50 MCG: 50 INJECTION INTRAMUSCULAR; INTRAVENOUS at 14:05

## 2024-08-26 RX ADMIN — SODIUM CHLORIDE, POTASSIUM CHLORIDE, SODIUM LACTATE AND CALCIUM CHLORIDE: 600; 310; 30; 20 INJECTION, SOLUTION INTRAVENOUS at 12:42

## 2024-08-26 RX ADMIN — ROCURONIUM BROMIDE 10 MG: 10 INJECTION, SOLUTION INTRAVENOUS at 13:29

## 2024-08-26 RX ADMIN — SODIUM CHLORIDE, POTASSIUM CHLORIDE, SODIUM LACTATE AND CALCIUM CHLORIDE: 600; 310; 30; 20 INJECTION, SOLUTION INTRAVENOUS at 14:29

## 2024-08-26 RX ADMIN — ONDANSETRON 4 MG: 2 INJECTION INTRAMUSCULAR; INTRAVENOUS at 14:48

## 2024-08-26 RX ADMIN — FENTANYL CITRATE 50 MCG: 50 INJECTION INTRAMUSCULAR; INTRAVENOUS at 14:27

## 2024-08-26 RX ADMIN — Medication 160 MG: at 13:29

## 2024-08-26 RX ADMIN — FENTANYL CITRATE 100 MCG: 50 INJECTION INTRAMUSCULAR; INTRAVENOUS at 13:28

## 2024-08-26 RX ADMIN — HYDROMORPHONE HYDROCHLORIDE 0.5 MG: 1 INJECTION, SOLUTION INTRAMUSCULAR; INTRAVENOUS; SUBCUTANEOUS at 15:21

## 2024-08-26 RX ADMIN — PROPOFOL 200 MG: 10 INJECTION, EMULSION INTRAVENOUS at 13:28

## 2024-08-26 RX ADMIN — MIDAZOLAM 2 MG: 1 INJECTION INTRAMUSCULAR; INTRAVENOUS at 13:20

## 2024-08-26 RX ADMIN — HYDROMORPHONE HYDROCHLORIDE 0.5 MG: 1 INJECTION, SOLUTION INTRAMUSCULAR; INTRAVENOUS; SUBCUTANEOUS at 15:32

## 2024-08-26 RX ADMIN — SODIUM CHLORIDE 3000 MG: 9 INJECTION, SOLUTION INTRAVENOUS at 13:44

## 2024-08-26 ASSESSMENT — PAIN DESCRIPTION - DESCRIPTORS
DESCRIPTORS: BURNING
DESCRIPTORS: ITCHING
DESCRIPTORS: NAGGING
DESCRIPTORS: BURNING
DESCRIPTORS: DISCOMFORT
DESCRIPTORS: NAGGING
DESCRIPTORS: BURNING

## 2024-08-26 ASSESSMENT — PAIN - FUNCTIONAL ASSESSMENT
PAIN_FUNCTIONAL_ASSESSMENT: 0-10
PAIN_FUNCTIONAL_ASSESSMENT: ACTIVITIES ARE NOT PREVENTED
PAIN_FUNCTIONAL_ASSESSMENT: PREVENTS OR INTERFERES SOME ACTIVE ACTIVITIES AND ADLS

## 2024-08-26 ASSESSMENT — PAIN DESCRIPTION - FREQUENCY
FREQUENCY: CONTINUOUS

## 2024-08-26 ASSESSMENT — PAIN DESCRIPTION - PAIN TYPE
TYPE: SURGICAL PAIN

## 2024-08-26 ASSESSMENT — PAIN SCALES - GENERAL
PAINLEVEL_OUTOF10: 7
PAINLEVEL_OUTOF10: 2
PAINLEVEL_OUTOF10: 4
PAINLEVEL_OUTOF10: 6
PAINLEVEL_OUTOF10: 7
PAINLEVEL_OUTOF10: 4

## 2024-08-26 ASSESSMENT — PAIN DESCRIPTION - LOCATION
LOCATION: COCCYX

## 2024-08-26 ASSESSMENT — LIFESTYLE VARIABLES: SMOKING_STATUS: 1

## 2024-08-26 NOTE — OP NOTE
Operative Note      Patient: Claudy Garner  YOB: 1995  MRN: [id number]    Date of Procedure: 8/26/2024    Pre-Op Diagnosis Codes:      * Pilonidal disease [L98.8]    Post-Op Diagnosis: Same       Procedure(s):  EXCISION PILONIDAL DISEASE WITH ROTATIONAL FLAP    Surgeon(s):  Willie Whyte DO    Assistant:   Resident: Oksana Zimmerman DO    Anesthesia: General    Estimated Blood Loss (mL): less than 50     Complications: None    Specimens:   ID Type Source Tests Collected by Time Destination   A : Pilonidal Disease Excision [id number] Tissue Sacrum SURGICAL PATHOLOGY Willie Whyte DO 8/26/2024 1418    B : Pilonidal Sinus Tract Tissue Sacrum SURGICAL PATHOLOGY Willie Whyte DO 8/26/2024 1443        Implants:  * No implants in log *      Drains: * No LDAs found *    Findings:  Infection Present At Time Of Surgery (PATOS) (choose all levels that have infection present):  - Deep Infection (muscle/fascia) present as evidenced by purulent fluid  Other Findings: pilonidal cyst with sinus tracts and chronic inflammatory changes    Detailed Description of Procedure:   Patient is a 29-year-old male with pilonidal disease.  The risks and benefits of pilonidal resection with flap closure were discussed with the patient and all questions were answered.  Patient provided written consent to proceed to the operating room.    Patient was brought to the operating room and placed in supine position.  General anesthesia was induced and endotracheal tube was used for airway control.  The patient was then placed in a prone position for the procedure.  Timeout was performed confirming correct patient, procedure, allergies.  Patient was prepped and draped in the usual sterile fashion with chlorhexidine skin prep.  3 g of Ancef were given for antimicrobial coverage.    The area of pilonidal disease was identified and the area of tissue excision measured 9 x 9 x 4 cm.  Based on the area of excision a rhomboid flap

## 2024-08-26 NOTE — ANESTHESIA PRE PROCEDURE
Department of Anesthesiology  Preprocedure Note       Name:  Claudy Garner   Age:  29 y.o.  :  1995                                          MRN:  [id number]         Date:  2024      Surgeon: Surgeon(s):  Willie Whyte DO    Procedure: Procedure(s):  EXCISION PILONIDAL DISEASE WITH ROTATIONAL FLAP    Medications prior to admission:   Prior to Admission medications    Medication Sig Start Date End Date Taking? Authorizing Provider   clindamycin (CLEOCIN T) 1 % lotion APPLY TOPICALLY TO AFFECTED AREAS twice a day 24  Yes Provider, MD Justin       Current medications:    Current Facility-Administered Medications   Medication Dose Route Frequency Provider Last Rate Last Admin   • ceFAZolin (ANCEF) 3000 mg in sodium chloride 0.9% 100 mL IVPB  3,000 mg IntraVENous Once Willie Whyte DO       • [START ON 2024] lidocaine PF 1 % injection 1 mL  1 mL IntraDERmal Once PRN Delgado Calderon MD       • 0.9 % sodium chloride infusion   IntraVENous Continuous Delgado Calderon MD       • lactated ringers IV soln infusion   IntraVENous Continuous Delgado Calderon MD       • BUPivacaine-EPINEPHrine (MARCAINE-w/EPINEPHrine) 0.25% -1:[id number] 30 mL, lidocaine 1 % 20 mL    PRN Willie Whyte DO   Given at 24 1237       Allergies:  No Known Allergies    Problem List:  There is no problem list on file for this patient.      Past Medical History:        Diagnosis Date   • ADHD        Past Surgical History:  History reviewed. No pertinent surgical history.    Social History:    Social History     Tobacco Use   • Smoking status: Every Day     Current packs/day: 1.00     Types: Cigarettes   • Smokeless tobacco: Never   Substance Use Topics   • Alcohol use: Yes     Comment: rarely                                Ready to quit: Not Answered  Counseling given: Not Answered      Vital Signs (Current):   Vitals:    24 1225 24 1232   BP: (!) 136/93    Pulse: 87    Resp: 18    Temp: 97.2 °F (36.2 °C)     SpO2: 96%    Weight:  (!) 160 kg (352 lb 11.8 oz)   Height:  1.854 m (6' 1\")                                              BP Readings from Last 3 Encounters:   08/26/24 (!) 136/93   08/12/24 (!) 146/78   06/17/21 (!) 140/93       NPO Status:                                                   Date of last liquid consumption: 08/25/24                        Date of last solid food consumption: 08/25/24    BMI:   Wt Readings from Last 3 Encounters:   08/26/24 (!) 160 kg (352 lb 11.8 oz)   08/12/24 (!) 160 kg (352 lb 11.8 oz)   06/17/21 (!) 158.8 kg (350 lb)     Body mass index is 46.54 kg/m².    CBC:   Lab Results   Component Value Date/Time    WBC 8.4 08/12/2024 03:02 PM    RBC 4.50 08/12/2024 03:02 PM    HGB 14.3 08/12/2024 03:02 PM    HCT 43.6 08/12/2024 03:02 PM    MCV 96.9 08/12/2024 03:02 PM    RDW 12.5 08/12/2024 03:02 PM     08/12/2024 03:02 PM       CMP:   Lab Results   Component Value Date/Time     08/12/2024 03:02 PM    K 4.0 08/12/2024 03:02 PM     08/12/2024 03:02 PM    CO2 26 08/12/2024 03:02 PM    BUN 9 08/12/2024 03:02 PM    CREATININE 0.8 08/12/2024 03:02 PM    LABGLOM >90 08/12/2024 03:02 PM    GLUCOSE 95 08/12/2024 03:02 PM    CALCIUM 9.7 08/12/2024 03:02 PM       POC Tests: No results for input(s): \"POCGLU\", \"POCNA\", \"POCK\", \"POCCL\", \"POCBUN\", \"POCHEMO\", \"POCHCT\" in the last 72 hours.    Coags: No results found for: \"PROTIME\", \"INR\", \"APTT\"    HCG (If Applicable): No results found for: \"PREGTESTUR\", \"PREGSERUM\", \"HCG\", \"HCGQUANT\"     ABGs: No results found for: \"PHART\", \"PO2ART\", \"NSK1EOU\", \"ZYZ6VMX\", \"BEART\", \"M1ZIIFQT\"     Type & Screen (If Applicable):  No results found for: \"LABABO\"    Drug/Infectious Status (If Applicable):  No results found for: \"HIV\", \"HEPCAB\"    COVID-19 Screening (If Applicable): No results found for: \"COVID19\"        Anesthesia Evaluation  Patient summary reviewed   no history of anesthetic complications (States mom has history of recall under

## 2024-08-26 NOTE — H&P
History and Physical Service   [facility]    HISTORY AND PHYSICAL EXAMINATION            Date of Evaluation: 8/26/2024  Patient name:  Claudy Garner  MRN:   [id number]  YOB: 1995  PCP:    No primary care provider on file.    History Obtained From:     Patient, medical records    History of Present Illness:     This is Claudy Garner a 29 y.o. male who presents today for a EXCISION PILONIDAL DISEASE WITH ROTATIONAL FLAP by Willie Whyte DO for Pilonidal disease. Patient reports he has been dealing with pilonidal disease for about 5 years. He states that it is constantly draining and just places a paper towel in the waistband of his pants to absorb the drainage. Patient reports it is occasionally painful and always itchy. He has tried antibiotics in the past with no success. Denies fever, chills, shortness of breath, cough, congestion, wheezing, chest pain, open sores or wounds. Denies hx of diabetes. Denies any current blood thinning medications.     Past Medical History:     Past Medical History:   Diagnosis Date    ADHD         Past Surgical History:     History reviewed. No pertinent surgical history.     Medications Prior to Admission:     Prior to Admission medications    Not on File        Allergies:     Patient has no known allergies.    Social History:     Tobacco:    reports that he has been smoking cigarettes. He has never used smokeless tobacco.  Alcohol:      reports current alcohol use.  Drug Use:  reports current drug use. Frequency: 7.00 times per week. Drug: Marijuana (Weed).    Family History:     History reviewed. No pertinent family history.    Review of Systems:     Positive and Negative as described in HPI.    CONSTITUTIONAL: negative for fevers, chills, sweats, fatigue, weight loss  HEENT: Glasses negative for hearing changes, runny nose, throat pain  RESPIRATORY: negative for shortness of breath, cough, congestion, wheezing.  CARDIOVASCULAR: negative for  cyst, acne to bilateral axilla and groin No gross visible lesions, bruising or bleeding on exposed skin area  Extremities: peripheral pulses palpable, no pedal edema or calf pain with palpation  Psych: normal affect     Investigations:      Laboratory Testing:  No results found for this or any previous visit (from the past 24 hour(s)).    Recent Labs     08/12/24  1502   HGB 14.3   HCT 43.6   WBC 8.4   MCV 96.9      K 4.0      CO2 26   BUN 9   CREATININE 0.8   GLUCOSE 95       No results for input(s): \"COVID19\" in the last 720 hours.  Imaging/Diagnostics:    No results found.    Diagnosis:      Pilonidal disease    Plans:     1. EXCISION PILONIDAL DISEASE WITH ROTATIONAL FLAP      ALPHONSE Thomson - CNP  8/26/2024  12:39 PM

## 2024-08-27 NOTE — ANESTHESIA POSTPROCEDURE EVALUATION
Department of Anesthesiology  Postprocedure Note    Patient: Claudy Garner  MRN: [id number]  YOB: 1995  Date of evaluation: 8/26/2024    Procedure Summary       Date: 08/26/24 Room / Location: [address]    Anesthesia Start: 1321 Anesthesia Stop: 1506    Procedure: EXCISION PILONIDAL DISEASE WITH ROTATIONAL FLAP Diagnosis:       Pilonidal disease      (Pilonidal disease [L98.8])    Surgeons: Willie Whyte DO Responsible Provider: Dionisio Kearney MD    Anesthesia Type: general ASA Status: 3            Anesthesia Type: No value filed.    Regino Phase I: Regino Score: 10    Regino Phase II: Regino Score: 10    Anesthesia Post Evaluation    Patient location during evaluation: PACU  Patient participation: complete - patient participated  Level of consciousness: awake and alert  Airway patency: patent  Nausea & Vomiting: no nausea and no vomiting  Cardiovascular status: hemodynamically stable  Respiratory status: acceptable  Hydration status: euvolemic  Pain management: adequate    No notable events documented.

## 2024-08-28 LAB — SURGICAL PATHOLOGY REPORT: NORMAL

## 2025-04-11 ENCOUNTER — OFFICE VISIT (OUTPATIENT)
Age: 30
End: 2025-04-11

## 2025-04-11 VITALS
TEMPERATURE: 98.1 F | OXYGEN SATURATION: 98 % | SYSTOLIC BLOOD PRESSURE: 115 MMHG | HEART RATE: 80 BPM | BODY MASS INDEX: 41.75 KG/M2 | RESPIRATION RATE: 18 BRPM | DIASTOLIC BLOOD PRESSURE: 78 MMHG | WEIGHT: 315 LBS | HEIGHT: 73 IN

## 2025-04-11 DIAGNOSIS — L05.01 PILONIDAL CYST WITH ABSCESS: Primary | ICD-10-CM

## 2025-04-11 RX ORDER — DOXYCYCLINE HYCLATE 100 MG
100 TABLET ORAL 2 TIMES DAILY
Qty: 20 TABLET | Refills: 0 | Status: SHIPPED | OUTPATIENT
Start: 2025-04-11 | End: 2025-04-21

## 2025-04-11 ASSESSMENT — ENCOUNTER SYMPTOMS
SORE THROAT: 0
SHORTNESS OF BREATH: 0
ABDOMINAL PAIN: 0
COUGH: 0

## 2025-04-11 NOTE — PROGRESS NOTES
[facility] URG[facility], [facility] ([place])  [facility] URGENT [facility] [place] [address]  Dept: [phone]    Date: 25    Claudy Garner (:  1995) is a 30 y.o. male, here for evaluation of the following chief complaint(s):  Cystitis      HPI    History provided by:  Patient   used: No    Mass  Location:  Sacral  Severity:  Moderate  Onset quality:  Gradual  Duration:  6 months  Progression:  Waxing and waning  Chronicity:  Chronic  Associated symptoms: no abdominal pain, no chest pain, no congestion, no cough, no ear pain, no fever, no headaches, no shortness of breath and no sore throat         ROS  Review of Systems   Constitutional:  Negative for chills and fever.   HENT:  Negative for congestion, ear pain and sore throat.    Respiratory:  Negative for cough and shortness of breath.    Cardiovascular:  Negative for chest pain.   Gastrointestinal:  Negative for abdominal pain.   Skin:  Positive for wound.   Neurological:  Negative for dizziness and headaches.   All other systems reviewed and are negative.      PHYSICAL EXAM  Vitals:    25 1522   BP: 115/78   BP Site: Left Upper Arm   Patient Position: Sitting   BP Cuff Size: Large Adult   Pulse: 80   Resp: 18   Temp: 98.1 °F (36.7 °C)   TempSrc: Skin   SpO2: 98%   Weight: (!) 152 kg (335 lb)   Height: 1.854 m (6' 1\")     Physical Exam  Constitutional:       Appearance: Normal appearance.   HENT:      Right Ear: Tympanic membrane normal.      Left Ear: Tympanic membrane normal.      Nose: Nose normal.      Mouth/Throat:      Mouth: Mucous membranes are moist.   Eyes:      Extraocular Movements: Extraocular movements intact.      Pupils: Pupils are equal, round, and reactive to light.   Cardiovascular:      Rate and Rhythm: Normal rate and regular rhythm.   Pulmonary:      Effort: Pulmonary effort is normal.      Breath sounds: Normal breath sounds.   Abdominal:      General: Abdomen is flat. Bowel

## 2025-06-21 ENCOUNTER — OFFICE VISIT (OUTPATIENT)
Age: 30
End: 2025-06-21

## 2025-06-21 VITALS
SYSTOLIC BLOOD PRESSURE: 138 MMHG | RESPIRATION RATE: 18 BRPM | OXYGEN SATURATION: 98 % | BODY MASS INDEX: 40.43 KG/M2 | WEIGHT: 315 LBS | HEIGHT: 74 IN | DIASTOLIC BLOOD PRESSURE: 108 MMHG

## 2025-06-21 DIAGNOSIS — L05.91 PILONIDAL CYST: ICD-10-CM

## 2025-06-21 DIAGNOSIS — R03.0 ELEVATED BLOOD PRESSURE READING: Primary | ICD-10-CM

## 2025-06-21 RX ORDER — DOXYCYCLINE HYCLATE 100 MG
100 TABLET ORAL 2 TIMES DAILY
Qty: 20 TABLET | Refills: 0 | Status: SHIPPED | OUTPATIENT
Start: 2025-06-21 | End: 2025-07-01

## 2025-06-21 RX ORDER — IBUPROFEN 800 MG/1
800 TABLET, FILM COATED ORAL EVERY 8 HOURS PRN
Qty: 60 TABLET | Refills: 0 | Status: SHIPPED | OUTPATIENT
Start: 2025-06-21

## 2025-06-21 ASSESSMENT — ENCOUNTER SYMPTOMS
BOWEL INCONTINENCE: 0
ABDOMINAL PAIN: 0
BACK PAIN: 0

## 2025-06-21 NOTE — PROGRESS NOTES
[facility] URGENT CARE, [facility] ([place])  [facility] URGENT CARE [place] [address]  Dept: [phone]    Date: 25    Claudy Garner (:  1995) is a 30 y.o. male, here for evaluation of the following chief complaint(s):  Back Pain (Surgery in September for cyst removal in the back and now is starting to cause some pain and irritation. )      HPI  30 y.o. male presents with a worsening pylinodal cyst. For two days. Had a a cyst removed at the same location last year.      History provided by:  Patient  Back Pain  The pain is present in the sacro-iliac. The quality of the pain is described as aching. The pain is at a severity of 5/10. The pain is moderate. The symptoms are aggravated by sitting. Pertinent negatives include no abdominal pain, bladder incontinence, bowel incontinence, dysuria, fever, headaches, numbness, paresthesias or perianal numbness.        ROS  Review of Systems   Constitutional:  Negative for chills, diaphoresis, fatigue and fever.   Gastrointestinal:  Negative for abdominal pain and bowel incontinence.   Genitourinary:  Negative for bladder incontinence and dysuria.   Musculoskeletal:  Negative for back pain and gait problem.   Skin:  Negative for pallor and rash.   Neurological:  Negative for dizziness, light-headedness, numbness, headaches and paresthesias.       PHYSICAL EXAM  Vitals:    25 1311   BP: (!) 165/117   BP Site: Left Upper Arm   Patient Position: Sitting   BP Cuff Size: Large Adult   Resp: 18   SpO2: 98%   Weight: (!) 152 kg (335 lb)   Height: 1.88 m (6' 2\")     Physical Exam  Vitals and nursing note reviewed.   Constitutional:       General: He is in acute distress.      Appearance: He is obese.   Eyes:      Conjunctiva/sclera: Conjunctivae normal.   Cardiovascular:      Rate and Rhythm: Normal rate.      Pulses: Normal pulses.      Heart sounds: Normal heart sounds.   Pulmonary:      Effort: Pulmonary effort is normal. No

## (undated) DEVICE — UNDERPAD INCONT 2XL FOR 38-58IN WAIST MESH PROTCT + DISP

## (undated) DEVICE — 4-PORT MANIFOLD: Brand: NEPTUNE 2

## (undated) DEVICE — PAD,ABDOMINAL,5"X9",ST,LF,25/BX: Brand: MEDLINE INDUSTRIES, INC.

## (undated) DEVICE — SUTURE VICRYL + SZ 2-0 L27IN ABSRB WHT SH 1/2 CIR TAPERCUT VCP417H

## (undated) DEVICE — Device

## (undated) DEVICE — SHEET, T, LAPAROTOMY, STERILE: Brand: MEDLINE

## (undated) DEVICE — SUTURE VICRYL + SZ 0 L27IN ABSRB WHT CT-2 1/2 CIR TAPERPOINT VCP270H

## (undated) DEVICE — SOLUTION SODIUM CHL 0.9% 500 ML IRRI BTL

## (undated) DEVICE — CUSHION PRONEVIEW L HD NK FOAM

## (undated) DEVICE — GAUZE WND W4XL108IN WHT PETRO W/ XRFRM COT IMPREG DISP

## (undated) DEVICE — GAUZE,SPONGE,FLUFF,6"X6.75",STRL,5/TRAY: Brand: MEDLINE

## (undated) DEVICE — TAPE ADH W3INXL10YD WHT PAPR GENTLE BRTH FLX COMFORTABLE

## (undated) DEVICE — PUNCH BX DIA5MM DERM S STL STD STR CIR SEAMLESS RIB HNDL

## (undated) DEVICE — GLOVE SURG SZ 75 CRM LTX FREE POLYISOPRENE POLYMER BEAD ANTI

## (undated) DEVICE — MARKER,SKIN,WI/RULER AND LABELS: Brand: MEDLINE

## (undated) DEVICE — SKIN PREP TRAY W/CHG: Brand: MEDLINE INDUSTRIES, INC.

## (undated) DEVICE — ELECTRODE PT RET AD L9FT HI MOIST COND ADH HYDRGEL CORDED

## (undated) DEVICE — STAZ MINOR BASIN: Brand: MEDLINE INDUSTRIES, INC.

## (undated) DEVICE — UNDERPANTS MAT L XL SEAMLESS CLR CODE WAISTBAND KNIT